# Patient Record
Sex: MALE | Race: WHITE
[De-identification: names, ages, dates, MRNs, and addresses within clinical notes are randomized per-mention and may not be internally consistent; named-entity substitution may affect disease eponyms.]

---

## 2020-02-16 ENCOUNTER — HOSPITAL ENCOUNTER (OUTPATIENT)
Dept: HOSPITAL 19 - COL.ER | Age: 65
Setting detail: OBSERVATION
LOS: 1 days | Discharge: HOME | End: 2020-02-17
Attending: INTERNAL MEDICINE | Admitting: INTERNAL MEDICINE
Payer: MEDICARE

## 2020-02-16 VITALS — SYSTOLIC BLOOD PRESSURE: 117 MMHG | HEART RATE: 77 BPM | TEMPERATURE: 98 F | DIASTOLIC BLOOD PRESSURE: 64 MMHG

## 2020-02-16 VITALS — SYSTOLIC BLOOD PRESSURE: 101 MMHG | DIASTOLIC BLOOD PRESSURE: 59 MMHG | HEART RATE: 85 BPM | TEMPERATURE: 98 F

## 2020-02-16 VITALS — HEIGHT: 70 IN | BODY MASS INDEX: 27.93 KG/M2 | WEIGHT: 195.11 LBS

## 2020-02-16 VITALS — SYSTOLIC BLOOD PRESSURE: 113 MMHG | DIASTOLIC BLOOD PRESSURE: 72 MMHG | TEMPERATURE: 97.7 F | HEART RATE: 14 BPM

## 2020-02-16 DIAGNOSIS — Z87.891: ICD-10-CM

## 2020-02-16 DIAGNOSIS — R55: Primary | ICD-10-CM

## 2020-02-16 DIAGNOSIS — Z79.82: ICD-10-CM

## 2020-02-16 DIAGNOSIS — Z88.0: ICD-10-CM

## 2020-02-16 DIAGNOSIS — I10: ICD-10-CM

## 2020-02-16 DIAGNOSIS — E78.5: ICD-10-CM

## 2020-02-16 DIAGNOSIS — F10.20: ICD-10-CM

## 2020-02-16 DIAGNOSIS — I25.10: ICD-10-CM

## 2020-02-16 LAB
ALBUMIN SERPL-MCNC: 3.9 GM/DL (ref 3.5–5)
ALP SERPL-CCNC: 91 U/L (ref 50–136)
ALT SERPL-CCNC: 30 U/L (ref 21–72)
ANION GAP SERPL CALC-SCNC: 13 MMOL/L (ref 7–16)
APTT PPP: 22.9 SECONDS (ref 26–37)
AST SERPL-CCNC: 41 U/L (ref 15–37)
BASOPHILS # BLD: 0.1 10*3/UL (ref 0–0.2)
BASOPHILS NFR BLD AUTO: 0.6 % (ref 0–2)
BILIRUB SERPL-MCNC: 0.7 MG/DL (ref 0–1)
BUN SERPL-MCNC: 14 MG/DL (ref 9–20)
CALCIUM SERPL-MCNC: 8.7 MG/DL (ref 8.4–10.2)
CHLORIDE SERPL-SCNC: 103 MMOL/L (ref 98–107)
CO2 SERPL-SCNC: 19 MMOL/L (ref 22–30)
CREAT SERPL-SCNC: 0.79 UMOL/L (ref 0.66–1.25)
EOSINOPHIL # BLD: 0.1 10*3/UL (ref 0–0.7)
EOSINOPHIL NFR BLD: 1.1 % (ref 0–4)
ERYTHROCYTE [DISTWIDTH] IN BLOOD BY AUTOMATED COUNT: 12.8 % (ref 11.5–14.5)
GLUCOSE SERPL-MCNC: 119 MG/DL (ref 74–106)
GRANULOCYTES # BLD AUTO: 51.8 % (ref 42.2–75.2)
HCT VFR BLD AUTO: 35.2 % (ref 42–52)
HGB BLD-MCNC: 11.6 G/DL (ref 13.5–18)
INR BLD: 1 (ref 0.8–3)
KETONES UR STRIP.AUTO-MCNC: (no result) MG/DL
LIPASE SERPL-CCNC: 63 U/L (ref 23–300)
LYMPHOCYTES # BLD: 2.9 10*3/UL (ref 1.2–3.4)
LYMPHOCYTES NFR BLD: 35.6 % (ref 20–51)
MAGNESIUM SERPL-MCNC: 1.8 MG/DL (ref 1.6–2.3)
MCH RBC QN AUTO: 28 PG (ref 27–31)
MCHC RBC AUTO-ENTMCNC: 33 G/DL (ref 33–37)
MCV RBC AUTO: 84 FL (ref 80–100)
MONOCYTES # BLD: 0.8 10*3/UL (ref 0.1–0.6)
MONOCYTES NFR BLD AUTO: 10.4 % (ref 1.7–9.3)
NEUTROPHILS # BLD: 4.2 10*3/UL (ref 1.4–6.5)
PH UR STRIP.AUTO: 7 [PH] (ref 5–8)
PHOSPHATE SERPL-MCNC: 3.1 MG/DL (ref 2.5–4.5)
PLATELET # BLD AUTO: 213 K/MM3 (ref 130–400)
PMV BLD AUTO: 9.5 FL (ref 7.4–10.4)
POTASSIUM SERPL-SCNC: 3.8 MMOL/L (ref 3.4–5)
PROT SERPL-MCNC: 6.4 GM/DL (ref 6.4–8.2)
PROTHROMBIN TIME: 11.5 SECONDS (ref 9.7–12.8)
RBC # BLD AUTO: 4.17 M/MM3 (ref 4.2–5.6)
RBC # UR: (no result) /HPF
SODIUM SERPL-SCNC: 135 MMOL/L (ref 137–145)
SP GR UR STRIP.AUTO: 1.02 (ref 1–1.03)
TROPONIN I SERPL-MCNC: < 0.012 NG/ML (ref 0–0.04)
URN COLLECT METHOD CLASS: (no result)

## 2020-02-16 PROCEDURE — A9500 TC99M SESTAMIBI: HCPCS

## 2020-02-16 PROCEDURE — G0378 HOSPITAL OBSERVATION PER HR: HCPCS

## 2020-02-16 NOTE — NUR
Shift assessment complete. Pt resting in bed, awake, a&o, cooperative c cares.
Pt denies pain or any other c/o. IV patent. Tele in place. Pt denies further
needs. Call light in reach, will continue to monitor.

## 2020-02-17 VITALS — DIASTOLIC BLOOD PRESSURE: 75 MMHG | HEART RATE: 91 BPM | SYSTOLIC BLOOD PRESSURE: 138 MMHG

## 2020-02-17 VITALS — SYSTOLIC BLOOD PRESSURE: 129 MMHG | HEART RATE: 85 BPM | DIASTOLIC BLOOD PRESSURE: 66 MMHG

## 2020-02-17 VITALS — HEART RATE: 88 BPM | SYSTOLIC BLOOD PRESSURE: 123 MMHG | DIASTOLIC BLOOD PRESSURE: 76 MMHG

## 2020-02-17 VITALS — HEART RATE: 67 BPM | DIASTOLIC BLOOD PRESSURE: 74 MMHG | TEMPERATURE: 98.3 F | SYSTOLIC BLOOD PRESSURE: 116 MMHG

## 2020-02-17 VITALS — DIASTOLIC BLOOD PRESSURE: 65 MMHG | HEART RATE: 75 BPM | SYSTOLIC BLOOD PRESSURE: 108 MMHG

## 2020-02-17 VITALS — DIASTOLIC BLOOD PRESSURE: 75 MMHG | HEART RATE: 65 BPM | SYSTOLIC BLOOD PRESSURE: 130 MMHG

## 2020-02-17 VITALS — TEMPERATURE: 97.8 F | HEART RATE: 75 BPM | DIASTOLIC BLOOD PRESSURE: 65 MMHG | SYSTOLIC BLOOD PRESSURE: 108 MMHG

## 2020-02-17 VITALS — TEMPERATURE: 97.8 F | DIASTOLIC BLOOD PRESSURE: 69 MMHG | SYSTOLIC BLOOD PRESSURE: 121 MMHG | HEART RATE: 82 BPM

## 2020-02-17 VITALS — HEART RATE: 81 BPM | SYSTOLIC BLOOD PRESSURE: 127 MMHG | DIASTOLIC BLOOD PRESSURE: 77 MMHG

## 2020-02-17 LAB
ANION GAP SERPL CALC-SCNC: 7 MMOL/L (ref 7–16)
BASOPHILS # BLD: 0 10*3/UL (ref 0–0.2)
BASOPHILS NFR BLD AUTO: 0.3 % (ref 0–2)
BUN SERPL-MCNC: 17 MG/DL (ref 9–20)
CALCIUM SERPL-MCNC: 8.1 MG/DL (ref 8.4–10.2)
CHLORIDE SERPL-SCNC: 108 MMOL/L (ref 98–107)
CHOLEST SPEC-SCNC: 103 MG/DL (ref 120–200)
CHOLEST/HDLC SERPL-SRTO: 3.5
CO2 SERPL-SCNC: 23 MMOL/L (ref 22–30)
CREAT SERPL-SCNC: 0.86 UMOL/L (ref 0.66–1.25)
EOSINOPHIL # BLD: 0.1 10*3/UL (ref 0–0.7)
EOSINOPHIL NFR BLD: 1.1 % (ref 0–4)
ERYTHROCYTE [DISTWIDTH] IN BLOOD BY AUTOMATED COUNT: 13 % (ref 11.5–14.5)
GLUCOSE SERPL-MCNC: 90 MG/DL (ref 74–106)
GRANULOCYTES # BLD AUTO: 69.4 % (ref 42.2–75.2)
HCT VFR BLD AUTO: 31.8 % (ref 42–52)
HDLC SERPL-MCNC: 29 MG/DL
HGB BLD-MCNC: 10.6 G/DL (ref 13.5–18)
LDLC SERPL-MCNC: 54 MG/DL
LYMPHOCYTES # BLD: 1.4 10*3/UL (ref 1.2–3.4)
LYMPHOCYTES NFR BLD: 22.1 % (ref 20–51)
MCH RBC QN AUTO: 28 PG (ref 27–31)
MCHC RBC AUTO-ENTMCNC: 33 G/DL (ref 33–37)
MCV RBC AUTO: 85 FL (ref 80–100)
MONOCYTES # BLD: 0.4 10*3/UL (ref 0.1–0.6)
MONOCYTES NFR BLD AUTO: 6.8 % (ref 1.7–9.3)
NEUTROPHILS # BLD: 4.5 10*3/UL (ref 1.4–6.5)
PLATELET # BLD AUTO: 161 K/MM3 (ref 130–400)
PMV BLD AUTO: 9.5 FL (ref 7.4–10.4)
POTASSIUM SERPL-SCNC: 4 MMOL/L (ref 3.4–5)
RBC # BLD AUTO: 3.74 M/MM3 (ref 4.2–5.6)
SODIUM SERPL-SCNC: 138 MMOL/L (ref 137–145)
TRIGL SERPL-MCNC: 99 MG/DL
TSH SERPL DL<=0.005 MIU/L-ACNC: 1.03 UIU/ML (ref 0.47–4.68)

## 2020-02-17 NOTE — NUR
Pt assessment completed and charted. Pt sitting in bed with wife at bedside.
Pt denies chest pain, dizziness, SOB, N/V/D. Pt is A&O, independent in room.
Pt on room air and tele. Breathing is even and unlabored. Pt denies any other
concerns at this time. NS running to RAC IV at 75ml/hr. LAC INT IV flushes w/o
complications.

## 2020-02-17 NOTE — NUR
Tele called about pt "looking like he was in Vfib". Checked on pt, he is
sitting in bed, wife at bedside, A&O, denies chest pain, dizziness, SOB,
nausea. "just hungry and want some coffee" states patient. New lead stickers
placed, checked with tele, appears better on monitor.

## 2020-02-17 NOTE — NUR
Pt discharging home. Discharge instructions discussed and reviewed w/ patient
and wife who verbalize understanding. All questions answered. LAC and RW INT
IV's dc'd w/ catheter tips intact and no complications. No further needs
expressed. Pt escorted out via WC by CHEL William.

## 2022-10-31 ENCOUNTER — HOSPITAL ENCOUNTER (INPATIENT)
Dept: HOSPITAL 19 - COL.ER | Age: 67
LOS: 3 days | Discharge: HOME | DRG: 175 | End: 2022-11-03
Attending: INTERNAL MEDICINE | Admitting: INTERNAL MEDICINE
Payer: MEDICARE

## 2022-10-31 VITALS — DIASTOLIC BLOOD PRESSURE: 75 MMHG | HEART RATE: 78 BPM | TEMPERATURE: 98.2 F | SYSTOLIC BLOOD PRESSURE: 119 MMHG

## 2022-10-31 VITALS — TEMPERATURE: 98.4 F | DIASTOLIC BLOOD PRESSURE: 80 MMHG | SYSTOLIC BLOOD PRESSURE: 116 MMHG | HEART RATE: 70 BPM

## 2022-10-31 VITALS — HEIGHT: 70 IN | WEIGHT: 173.72 LBS | BODY MASS INDEX: 24.87 KG/M2

## 2022-10-31 DIAGNOSIS — I95.9: ICD-10-CM

## 2022-10-31 DIAGNOSIS — Z88.8: ICD-10-CM

## 2022-10-31 DIAGNOSIS — H81.10: ICD-10-CM

## 2022-10-31 DIAGNOSIS — J98.11: ICD-10-CM

## 2022-10-31 DIAGNOSIS — I25.10: ICD-10-CM

## 2022-10-31 DIAGNOSIS — Z23: ICD-10-CM

## 2022-10-31 DIAGNOSIS — F10.90: ICD-10-CM

## 2022-10-31 DIAGNOSIS — E87.1: ICD-10-CM

## 2022-10-31 DIAGNOSIS — E78.5: ICD-10-CM

## 2022-10-31 DIAGNOSIS — Z90.89: ICD-10-CM

## 2022-10-31 DIAGNOSIS — I48.91: ICD-10-CM

## 2022-10-31 DIAGNOSIS — I82.812: ICD-10-CM

## 2022-10-31 DIAGNOSIS — I21.A1: ICD-10-CM

## 2022-10-31 DIAGNOSIS — I10: ICD-10-CM

## 2022-10-31 DIAGNOSIS — Z87.891: ICD-10-CM

## 2022-10-31 DIAGNOSIS — E78.1: ICD-10-CM

## 2022-10-31 DIAGNOSIS — Z88.0: ICD-10-CM

## 2022-10-31 DIAGNOSIS — D64.9: ICD-10-CM

## 2022-10-31 DIAGNOSIS — D75.829: ICD-10-CM

## 2022-10-31 DIAGNOSIS — I26.99: Primary | ICD-10-CM

## 2022-10-31 DIAGNOSIS — Z95.1: ICD-10-CM

## 2022-10-31 DIAGNOSIS — I49.3: ICD-10-CM

## 2022-10-31 DIAGNOSIS — D72.829: ICD-10-CM

## 2022-10-31 LAB
ALBUMIN SERPL-MCNC: 3.2 GM/DL (ref 3.4–4.8)
ALP SERPL-CCNC: 94 U/L (ref 40–150)
ALT SERPL-CCNC: 47 U/L (ref 0–55)
ANION GAP SERPL CALC-SCNC: 10 MMOL/L (ref 7–16)
APTT PPP: 29.8 SECONDS (ref 26–37)
AST SERPL-CCNC: 67 U/L (ref 5–34)
BASOPHILS # BLD: 0.1 K/MM3 (ref 0–0.2)
BASOPHILS NFR BLD AUTO: 0.4 % (ref 0–2)
BILIRUB SERPL-MCNC: 0.5 MG/DL (ref 0.2–1.2)
BUN SERPL-MCNC: 16 MG/DL (ref 8–26)
CALCIUM SERPL-MCNC: 8.5 MG/DL (ref 8.4–10.2)
CHLORIDE SERPL-SCNC: 100 MMOL/L (ref 98–107)
CO2 SERPL-SCNC: 20 MMOL/L (ref 23–31)
CREAT SERPL-SCNC: 0.83 MG/DL (ref 0.72–1.25)
EOSINOPHIL # BLD: 0.1 K/MM3 (ref 0–0.7)
EOSINOPHIL NFR BLD: 1 % (ref 0–4)
ERYTHROCYTE [DISTWIDTH] IN BLOOD BY AUTOMATED COUNT: 13.8 % (ref 11.5–14.5)
GLUCOSE SERPL-MCNC: 115 MG/DL (ref 70–99)
GRANULOCYTES # BLD AUTO: 83.8 % (ref 42.2–75.2)
HCT VFR BLD AUTO: 28.8 % (ref 42–52)
HGB BLD-MCNC: 9.9 G/DL (ref 13.5–18)
LYMPHOCYTES # BLD: 0.9 K/MM3 (ref 1.2–3.4)
LYMPHOCYTES NFR BLD: 7.7 % (ref 20–51)
MCH RBC QN AUTO: 31 PG (ref 27–31)
MCHC RBC AUTO-ENTMCNC: 34 G/DL (ref 33–37)
MCV RBC AUTO: 91 FL (ref 80–100)
MONOCYTES # BLD: 0.7 K/MM3 (ref 0.1–0.6)
MONOCYTES NFR BLD AUTO: 6.6 % (ref 1.7–9.3)
NEUTROPHILS # BLD: 9.4 K/MM3 (ref 1.4–6.5)
PLATELET # BLD AUTO: 73 K/MM3 (ref 130–400)
PMV BLD AUTO: 10.4 FL (ref 7.4–10.4)
POTASSIUM SERPL-SCNC: 4.3 MMOL/L (ref 3.5–4.5)
PROT SERPL-MCNC: 6.1 GM/DL (ref 6.2–8.1)
RBC # BLD AUTO: 3.15 M/MM3 (ref 4.2–5.6)
SODIUM SERPL-SCNC: 130 MMOL/L (ref 136–145)
TROPONIN I SERPL-MCNC: 8.85 NG/ML (ref 0–0.03)

## 2022-10-31 PROCEDURE — C8924 2D TTE W OR W/O FOL W/CON,FU: HCPCS

## 2022-10-31 NOTE — NUR
SHIFT REPORT RECEIVED. PT A&O. PT WITTH MID STERNAL INCISION WELL
APPROXIMATED. MID UPPER ABD X3 INCISIONS 1 CM IN LENGTH FROM DRAINS. 2
INCISIONS SCABED AND HEALED, 1 INCISION WITH SCANT DRAINAGE. LT LEG INNER KNEE
INCISION WITH STERISTRIP IN PLACE. BRUISING AT AND ABOVE INCISION SITE.
TEDHOSE KNEE HIGH IN PLACE. MILD SWELLING IN LT ANKLE. C/O ACHY PAIN BL ARMS
TRICEPT AREA TO UNDER ARM PECTORIAL AREA. PT REPORTS TAKING TYLENOL AND NITRO
FOR THIS DISCOMFORT

## 2022-10-31 NOTE — NUR
PT ADMITTED FROM ED AT 1806. PT BROUGHT OVER ON ED STRETCHER BUT IS ABLE TO
STAND TO TRANSFER TO ICU BED. HEPARIN INFUSION RUNNING AT 15ML/HR TO 20G IV IN
R AC AS ORDERED. PT HAS MIDLINE INCISION TO CHEST FROM RECENT CABG, EDGES
ARE WELL APPROXIMATED, NO DRAINAGE NOTED, INCISION IS EARL. SMALL INCISION TO
UPPER MIDDLE ABD FROM DRAIN SITES R/T CABG. INCISION IS COVERED W/ GAUZE,
SMALL AMOUNT OF DRAINAGE NOTED. TWO SMALL INCISIONS NOTED TO LEFT LEG FROM
VEIN GRAFT SITE, EDGES APPROXIMATED AND COVERED W/ STERI STRIPS. LARGE BRUISED
AREA PRESENT TO LEFT INNER KNEE/THIGH AREA ALSO R/T SURGERY. PT IS ALERT AND
ORIENTED, USES CALL LIGHT FOR NEEDS. PT IS WEARING GLASSES AND WEDDING RING
AND HAS PANTS, SHIRT, SOCKS, CARISSA HOSE, SLIPPERS, AND CELL PHONE IN ROOM. WIFE
TO TAKE WALLET HOME.

## 2022-11-01 VITALS — DIASTOLIC BLOOD PRESSURE: 90 MMHG | SYSTOLIC BLOOD PRESSURE: 143 MMHG | HEART RATE: 74 BPM | TEMPERATURE: 98.2 F

## 2022-11-01 VITALS — HEART RATE: 66 BPM | DIASTOLIC BLOOD PRESSURE: 69 MMHG | TEMPERATURE: 98.7 F | SYSTOLIC BLOOD PRESSURE: 110 MMHG

## 2022-11-01 VITALS — SYSTOLIC BLOOD PRESSURE: 116 MMHG | TEMPERATURE: 97.6 F | HEART RATE: 70 BPM | DIASTOLIC BLOOD PRESSURE: 74 MMHG

## 2022-11-01 VITALS — DIASTOLIC BLOOD PRESSURE: 79 MMHG | HEART RATE: 69 BPM | SYSTOLIC BLOOD PRESSURE: 118 MMHG

## 2022-11-01 VITALS — TEMPERATURE: 97.5 F | DIASTOLIC BLOOD PRESSURE: 83 MMHG | SYSTOLIC BLOOD PRESSURE: 120 MMHG | HEART RATE: 71 BPM

## 2022-11-01 VITALS — TEMPERATURE: 98.7 F | DIASTOLIC BLOOD PRESSURE: 77 MMHG | HEART RATE: 98 BPM | SYSTOLIC BLOOD PRESSURE: 122 MMHG

## 2022-11-01 VITALS — TEMPERATURE: 98 F | SYSTOLIC BLOOD PRESSURE: 116 MMHG | DIASTOLIC BLOOD PRESSURE: 69 MMHG | HEART RATE: 972 BPM

## 2022-11-01 LAB
ALBUMIN SERPL-MCNC: 3 GM/DL (ref 3.4–4.8)
ANION GAP SERPL CALC-SCNC: 11 MMOL/L (ref 7–16)
BASOPHILS # BLD: 0 K/MM3 (ref 0–0.2)
BASOPHILS NFR BLD AUTO: 0.2 % (ref 0–2)
BUN SERPL-MCNC: 14 MG/DL (ref 8–26)
CALCIUM SERPL-MCNC: 8.3 MG/DL (ref 8.4–10.2)
CHLORIDE SERPL-SCNC: 100 MMOL/L (ref 98–107)
CO2 SERPL-SCNC: 21 MMOL/L (ref 23–31)
CREAT SERPL-SCNC: 0.82 MG/DL (ref 0.72–1.25)
EOSINOPHIL # BLD: 0.1 K/MM3 (ref 0–0.7)
EOSINOPHIL NFR BLD: 0.7 % (ref 0–4)
EOSINOPHIL NFR BLD: 1 % (ref 0–4)
ERYTHROCYTE [DISTWIDTH] IN BLOOD BY AUTOMATED COUNT: 13.5 % (ref 11.5–14.5)
ERYTHROCYTE [DISTWIDTH] IN BLOOD BY AUTOMATED COUNT: 13.9 % (ref 11.5–14.5)
GLUCOSE SERPL-MCNC: 112 MG/DL (ref 70–99)
GRANULOCYTES # BLD AUTO: 86.7 % (ref 42.2–75.2)
HCT VFR BLD AUTO: 26.6 % (ref 42–52)
HCT VFR BLD AUTO: 28.4 % (ref 42–52)
HGB BLD-MCNC: 9.4 G/DL (ref 13.5–18)
HGB BLD-MCNC: 9.7 G/DL (ref 13.5–18)
LYMPHOCYTES # BLD: 0.7 K/MM3 (ref 1.2–3.4)
LYMPHOCYTES NFR BLD MANUAL: 7 % (ref 20–51)
LYMPHOCYTES NFR BLD: 6.3 % (ref 20–51)
MAGNESIUM SERPL-MCNC: 2.1 MG/DL (ref 1.6–2.6)
MCH RBC QN AUTO: 31 PG (ref 27–31)
MCH RBC QN AUTO: 32 PG (ref 27–31)
MCHC RBC AUTO-ENTMCNC: 34 G/DL (ref 33–37)
MCHC RBC AUTO-ENTMCNC: 35 G/DL (ref 33–37)
MCV RBC AUTO: 90 FL (ref 80–100)
MCV RBC AUTO: 92 FL (ref 80–100)
MONOCYTES # BLD: 0.6 K/MM3 (ref 0.1–0.6)
MONOCYTES NFR BLD AUTO: 5.4 % (ref 1.7–9.3)
MONOCYTES NFR BLD: 4 % (ref 1.7–9.3)
NEUTROPHILS # BLD: 9.5 K/MM3 (ref 1.4–6.5)
NEUTS SEG NFR BLD MANUAL: 88 % (ref 42–75.2)
PHOSPHATE SERPL-MCNC: 4.2 MG/DL (ref 2.3–4.7)
PLATELET # BLD AUTO: 47 K/MM3 (ref 130–400)
PLATELET # BLD AUTO: 48 K/MM3 (ref 130–400)
PLATELET BLD QL SMEAR: (no result)
PMV BLD AUTO: 9.6 FL (ref 7.4–10.4)
PMV BLD AUTO: 9.6 FL (ref 7.4–10.4)
POTASSIUM SERPL-SCNC: 4 MMOL/L (ref 3.5–4.5)
RBC # BLD AUTO: 2.97 M/MM3 (ref 4.2–5.6)
RBC # BLD AUTO: 3.1 M/MM3 (ref 4.2–5.6)
SODIUM SERPL-SCNC: 132 MMOL/L (ref 136–145)
TROPONIN I SERPL-MCNC: 13.25 NG/ML (ref 0–0.03)

## 2022-11-01 NOTE — NUR
Initial visit; Patient stated he didn't know what he wanted from .
 asked if she could keep him in her prayers and he said that would be
fine. His wife asked  to call their  which later  called
and found everyone on vacation but left a message on the answering machine
regarding Tejas's hospitalization and Rm. number.

## 2022-11-01 NOTE — NUR
SW met with patient to complete intake. Patients wife Avelina ([phone]) is
present at bedside. Much of the intake is completed by the patients wife.
Together, they live at home in [place]. Patient recently had a CABG done on
10/19 and since then, his wife has had to assist him with showering, but he
has been able to complete all of his other ADL's on his own. Patient has
access to a walker at home, but reports to not needing it. He has no home
oxygen needs. PCP is  and he utilizes [facility] for prescriptions.
Per Avelina, patient does have a DPO[employer]-HC established listing her as her agent,
but would "have to find it". Patient getting preped to go down and get a CT
scan at this time.

## 2022-11-01 NOTE — NUR
Patient woke around 0200 complaining of 'hot flash,' that left him
dizzy, lightheaded, and sweaty. Vitals remained within normal limits. Lung and
heart sounds normal to auscultation. BG of 108. Room fan turned on per
patient's request. Wicho, hospitalist, notified. No new orders received.
Patient continued to voice concern of sudden symtpoms despite reassurance
from staff. Patient phoned wife, Avelina, who spoke with this RN.
 
Patient became nauseous and began dry-heaving without emesis around 0330.
Wicho notified.  Orders received for PRN zofran.
 
EKG and AM labs obtained. Results forwarded to Wicho. No new orders at this
time.

## 2022-11-02 VITALS — HEART RATE: 67 BPM | SYSTOLIC BLOOD PRESSURE: 90 MMHG | TEMPERATURE: 98.2 F | DIASTOLIC BLOOD PRESSURE: 61 MMHG

## 2022-11-02 VITALS — HEART RATE: 61 BPM | SYSTOLIC BLOOD PRESSURE: 108 MMHG | DIASTOLIC BLOOD PRESSURE: 72 MMHG | TEMPERATURE: 97.7 F

## 2022-11-02 VITALS — HEART RATE: 74 BPM | DIASTOLIC BLOOD PRESSURE: 77 MMHG | TEMPERATURE: 97.9 F | SYSTOLIC BLOOD PRESSURE: 116 MMHG

## 2022-11-02 VITALS — DIASTOLIC BLOOD PRESSURE: 71 MMHG | SYSTOLIC BLOOD PRESSURE: 107 MMHG | HEART RATE: 63 BPM | TEMPERATURE: 98.4 F

## 2022-11-02 VITALS — HEART RATE: 72 BPM | TEMPERATURE: 98 F | SYSTOLIC BLOOD PRESSURE: 115 MMHG | DIASTOLIC BLOOD PRESSURE: 68 MMHG

## 2022-11-02 VITALS — HEART RATE: 67 BPM | TEMPERATURE: 98.2 F | DIASTOLIC BLOOD PRESSURE: 81 MMHG | SYSTOLIC BLOOD PRESSURE: 111 MMHG

## 2022-11-02 LAB
ALBUMIN SERPL-MCNC: 2.8 GM/DL (ref 3.4–4.8)
ANION GAP SERPL CALC-SCNC: 9 MMOL/L (ref 7–16)
BASOPHILS # BLD: 0 K/MM3 (ref 0–0.2)
BASOPHILS NFR BLD AUTO: 0.3 % (ref 0–2)
BUN SERPL-MCNC: 10 MG/DL (ref 8–26)
CALCIUM SERPL-MCNC: 8.4 MG/DL (ref 8.4–10.2)
CHLORIDE SERPL-SCNC: 101 MMOL/L (ref 98–107)
CO2 SERPL-SCNC: 22 MMOL/L (ref 23–31)
CREAT SERPL-SCNC: 0.8 MG/DL (ref 0.72–1.25)
EOSINOPHIL # BLD: 0.2 K/MM3 (ref 0–0.7)
EOSINOPHIL NFR BLD: 1.8 % (ref 0–4)
ERYTHROCYTE [DISTWIDTH] IN BLOOD BY AUTOMATED COUNT: 13.8 % (ref 11.5–14.5)
GLUCOSE SERPL-MCNC: 101 MG/DL (ref 70–99)
GRANULOCYTES # BLD AUTO: 82 % (ref 42.2–75.2)
HCT VFR BLD AUTO: 26.6 % (ref 42–52)
HGB BLD-MCNC: 9.3 G/DL (ref 13.5–18)
LYMPHOCYTES # BLD: 0.9 K/MM3 (ref 1.2–3.4)
LYMPHOCYTES NFR BLD: 9.7 % (ref 20–51)
MAGNESIUM SERPL-MCNC: 2.1 MG/DL (ref 1.6–2.6)
MCH RBC QN AUTO: 31 PG (ref 27–31)
MCHC RBC AUTO-ENTMCNC: 35 G/DL (ref 33–37)
MCV RBC AUTO: 90 FL (ref 80–100)
MONOCYTES # BLD: 0.5 K/MM3 (ref 0.1–0.6)
MONOCYTES NFR BLD AUTO: 5.7 % (ref 1.7–9.3)
NEUTROPHILS # BLD: 7.7 K/MM3 (ref 1.4–6.5)
PHOSPHATE SERPL-MCNC: 4.4 MG/DL (ref 2.3–4.7)
PLATELET # BLD AUTO: 58 K/MM3 (ref 130–400)
PMV BLD AUTO: 9.8 FL (ref 7.4–10.4)
POTASSIUM SERPL-SCNC: 4.1 MMOL/L (ref 3.5–4.5)
RBC # BLD AUTO: 2.97 M/MM3 (ref 4.2–5.6)
SODIUM SERPL-SCNC: 132 MMOL/L (ref 136–145)

## 2022-11-02 NOTE — NUR
Follow_up visit- looked in on patient and his wife and let them know
she located his  but had to leave a message. They thanked  for
her efforts and said that their  had gotten the message and contacted
them last night.

## 2022-11-02 NOTE — NUR
REPORT RECEIVED FROM GEORGINA SALAZAR; PATIENT CURRENTLY RESTING IN BED, VITAL
SIGNS WITHIN NORMAL LIMITS AND NO FLUIDS ARE RUNNING THROUGH HIS PERIPHERAL
LINE.

## 2022-11-02 NOTE — NUR
RE: CARDIAC REHAB - STAFF SAW ON 11/1/22. RECENT
CABG. REVIEWED RISK FACTORS FOR HEART DISEASE,
SCHEDULED FOR INITIAL EVAL FOR CARDIAC REHAB, PENDING CARDIOLOGIST FOLLOW UP
POST DISCHARGE FROM CURRENT HOSPITALIZATION. SCHEDULED FOR TUESDAY, NOV. 15TH.
APPROX 15 MIN SPEND FACE TO FACE WITH PATIENT.

## 2022-11-03 VITALS — HEART RATE: 68 BPM | DIASTOLIC BLOOD PRESSURE: 72 MMHG | SYSTOLIC BLOOD PRESSURE: 105 MMHG | TEMPERATURE: 97.9 F

## 2022-11-03 VITALS — DIASTOLIC BLOOD PRESSURE: 71 MMHG | TEMPERATURE: 97.6 F | SYSTOLIC BLOOD PRESSURE: 124 MMHG | HEART RATE: 72 BPM

## 2022-11-03 VITALS — SYSTOLIC BLOOD PRESSURE: 116 MMHG | TEMPERATURE: 98.3 F | HEART RATE: 67 BPM | DIASTOLIC BLOOD PRESSURE: 71 MMHG

## 2022-11-03 LAB
ALBUMIN SERPL-MCNC: 2.8 GM/DL (ref 3.4–4.8)
ANION GAP SERPL CALC-SCNC: 11 MMOL/L (ref 7–16)
BASOPHILS # BLD: 0.1 K/MM3 (ref 0–0.2)
BASOPHILS NFR BLD AUTO: 0.6 % (ref 0–2)
BUN SERPL-MCNC: 12 MG/DL (ref 8–26)
CALCIUM SERPL-MCNC: 8.4 MG/DL (ref 8.4–10.2)
CHLORIDE SERPL-SCNC: 100 MMOL/L (ref 98–107)
CO2 SERPL-SCNC: 20 MMOL/L (ref 23–31)
CREAT SERPL-SCNC: 0.86 MG/DL (ref 0.72–1.25)
EOSINOPHIL # BLD: 0.2 K/MM3 (ref 0–0.7)
EOSINOPHIL NFR BLD: 2.4 % (ref 0–4)
ERYTHROCYTE [DISTWIDTH] IN BLOOD BY AUTOMATED COUNT: 14 % (ref 11.5–14.5)
GLUCOSE SERPL-MCNC: 97 MG/DL (ref 70–99)
GRANULOCYTES # BLD AUTO: 78 % (ref 42.2–75.2)
HCT VFR BLD AUTO: 27.8 % (ref 42–52)
HGB BLD-MCNC: 9.7 G/DL (ref 13.5–18)
LYMPHOCYTES # BLD: 1.1 K/MM3 (ref 1.2–3.4)
LYMPHOCYTES NFR BLD: 12.6 % (ref 20–51)
MAGNESIUM SERPL-MCNC: 2.1 MG/DL (ref 1.6–2.6)
MCH RBC QN AUTO: 31 PG (ref 27–31)
MCHC RBC AUTO-ENTMCNC: 35 G/DL (ref 33–37)
MCV RBC AUTO: 89 FL (ref 80–100)
MONOCYTES # BLD: 0.5 K/MM3 (ref 0.1–0.6)
MONOCYTES NFR BLD AUTO: 6.1 % (ref 1.7–9.3)
NEUTROPHILS # BLD: 6.9 K/MM3 (ref 1.4–6.5)
PHOSPHATE SERPL-MCNC: 4.3 MG/DL (ref 2.3–4.7)
PLATELET # BLD AUTO: 66 K/MM3 (ref 130–400)
PMV BLD AUTO: 9.7 FL (ref 7.4–10.4)
POTASSIUM SERPL-SCNC: 4.2 MMOL/L (ref 3.5–4.5)
RBC # BLD AUTO: 3.11 M/MM3 (ref 4.2–5.6)
SODIUM SERPL-SCNC: 131 MMOL/L (ref 136–145)

## 2022-11-03 NOTE — NUR
REPORT RECEIVED FROM GEORGINA GONZALEZ; PATIENT CURRENTLY RESTING IN BED AND VITAL
SIGNS ARE WITHIN NORMAL LIMITS. PATIENT HAS NO FLUIDS OR MEDS RUNNING AT THIS
TIME.

## 2022-11-03 NOTE — NUR
PATIENT DISCHARGED TODAY AT 1249; PATIENT WAS IN STABLE CONDITION AND WAS
TAKEN OUT VIA WHEELCHAIR ACCOMPANIED BY THIS NURSE AND HIS WIFE, ADOLPH.

## 2023-01-30 ENCOUNTER — HOSPITAL ENCOUNTER (OUTPATIENT)
Dept: HOSPITAL 19 - COL.CR | Age: 68
LOS: 1 days | Discharge: HOME | End: 2023-01-31
Payer: MEDICARE

## 2023-01-30 DIAGNOSIS — Z95.1: ICD-10-CM

## 2023-01-30 DIAGNOSIS — Z48.812: Primary | ICD-10-CM

## 2023-02-22 ENCOUNTER — HOSPITAL ENCOUNTER (OUTPATIENT)
Dept: HOSPITAL 19 - COL.CR | Age: 68
Discharge: HOME | End: 2023-02-22
Payer: MEDICARE

## 2023-02-22 DIAGNOSIS — I20.8: ICD-10-CM

## 2023-02-22 DIAGNOSIS — Z95.1: ICD-10-CM

## 2023-02-22 DIAGNOSIS — Z48.812: Primary | ICD-10-CM
